# Patient Record
(demographics unavailable — no encounter records)

---

## 2024-12-31 NOTE — REASON FOR VISIT
[FreeTextEntry2] : Patient is a 50 year old female with complaints of darryl knee pain for about 6 months. She previously seen Dr. Marek Polo for both knees, had cortisone injections. Pain is worse when getting up from sitting down and walking. Patient has been taking Celebrex. Also tried ointments.

## 2024-12-31 NOTE — HISTORY OF PRESENT ILLNESS
[10] : 10 [9] : 9 [Sharp] : sharp [Shooting] : shooting [Constant] : constant [Household chores] : household chores [Leisure] : leisure [Rest] : rest [Meds] : meds [Ice] : ice [Heat] : heat [Sitting] : sitting [Walking] : walking [Full time] : Work status: full time [FreeTextEntry1] : darryl knees [] : Post Surgical Visit: no [FreeTextEntry9] : Celebrex lidocaine  [de-identified] : 6/27/24 [de-identified] : Dr. Polo

## 2024-12-31 NOTE — ASSESSMENT
[FreeTextEntry1] : diagnosis discussed and treatment options including TKRs. Will try CSIs today. Instructed on ice and knee braces. Prescribed PT and diclofenac gel. Will order Gelsyn injections both knees. Possible risks of injections discussed including infection, wants to proceed.

## 2024-12-31 NOTE — PHYSICAL EXAM
[NL (0)] : extension 0 degrees [4___] : quadriceps 4[unfilled]/5 [] : antalgic [Bilateral] : knee bilaterally [AP] : anteroposterior [Lateral] : lateral [[place]] : skyline [Degenerative change] : Degenerative change [Moderate tricompartmental OA medial narrowing] : Moderate tricompartmental OA medial narrowing [Moderate tricompartmental OA lateral narrowing] : Moderate tricompartmental OA lateral narrowing [Moderate patellofemoral OA] : Moderate patellofemoral OA [TWNoteComboBox7] : flexion 120 degrees

## 2025-01-27 NOTE — HISTORY OF PRESENT ILLNESS
[10] : 10 [9] : 9 [Sharp] : sharp [Shooting] : shooting [Constant] : constant [Household chores] : household chores [Leisure] : leisure [Rest] : rest [Meds] : meds [Ice] : ice [Heat] : heat [Sitting] : sitting [Walking] : walking [Full time] : Work status: full time [1] : 1 [Orthovisc] : Orthovisc [] : Post Surgical Visit: no [FreeTextEntry1] : darryl knees [de-identified] : 12/31/24 [FreeTextEntry9] : Celebrex lidocaine  [de-identified] : Dr. Polo

## 2025-01-27 NOTE — PHYSICAL EXAM
[NL (0)] : extension 0 degrees [4___] : quadriceps 4[unfilled]/5 [Bilateral] : knee bilaterally [AP] : anteroposterior [Lateral] : lateral [[place]] : skyline [Degenerative change] : Degenerative change [Moderate tricompartmental OA medial narrowing] : Moderate tricompartmental OA medial narrowing [Moderate tricompartmental OA lateral narrowing] : Moderate tricompartmental OA lateral narrowing [Moderate patellofemoral OA] : Moderate patellofemoral OA [] : no calf tenderness [TWNoteComboBox7] : flexion 120 degrees

## 2025-02-05 NOTE — PROCEDURE
[Large Joint Injection] : Large joint injection [Bilateral] : bilaterally of the [Knee] : knee [Pain] : pain [X-ray evidence of Osteoarthritis on this or prior visit] : x-ray evidence of Osteoarthritis on this or prior visit [Betadine] : betadine [Ethyl Chloride sprayed topically] : ethyl chloride sprayed topically [Sterile technique used] : sterile technique used [Orthovisc (30mg)] : 30mg of Orthovisc [] : Patient tolerated procedure well [Apply ice for 15min out of every hour for the next 12-24 hours as tolerated] : apply ice for 15 minutes out of every hour for the next 12-24 hours as tolerated [Patient was advised to rest the joint(s) for ____ days] : patient was advised to rest the joint(s) for [unfilled] days [Risks, benefits, alternatives discussed / Verbal consent obtained] : the risks benefits, and alternatives have been discussed, and verbal consent was obtained [Prior failure or difficult injection] : prior failure or difficult injection [All ultrasound images have been permanently captured and stored accordingly in our picture archiving and communication system] : All ultrasound images have been permanently captured and stored accordingly in our picture archiving and communication system [Visualization of the needle and placement of injection was performed without complication] : visualization of the needle and placement of injection was performed without complication [#2] : series #2

## 2025-02-05 NOTE — ASSESSMENT
[FreeTextEntry1] : recommend rest and apply ice to the knee today requested diclofenac, prescription given. Instructions also  The patient was prescribed an anti- inflammatory medication at today's visit. The patient was advised that this medication should be taken with food as they can cause gastrointestinal upset. They patient is also aware that these medications may exacerbate any pre existing hypertension and they should speak with their medical doctor before starting the medication. They were advised to stop the medication if they experience any stomach upset or develop headaches or blurry vision.

## 2025-02-05 NOTE — HISTORY OF PRESENT ILLNESS
[10] : 10 [9] : 9 [Sharp] : sharp [Shooting] : shooting [Constant] : constant [Household chores] : household chores [Leisure] : leisure [Rest] : rest [Meds] : meds [Ice] : ice [Heat] : heat [Sitting] : sitting [Walking] : walking [Full time] : Work status: full time [2] : 2 [Orthovisc] : Orthovisc [] : Post Surgical Visit: no [FreeTextEntry1] : darryl knees [FreeTextEntry9] : Celebrex lidocaine  [de-identified] : 12/31/24 [de-identified] : Dr. Polo

## 2025-02-05 NOTE — PHYSICAL EXAM
[NL (0)] : extension 0 degrees [4___] : quadriceps 4[unfilled]/5 [] : no calf tenderness [TWNoteComboBox7] : flexion 120 degrees

## 2025-02-10 NOTE — PHYSICAL EXAM
[NL (0)] : extension 0 degrees [4___] : quadriceps 4[unfilled]/5 [Bilateral] : knee bilaterally [] : no calf tenderness [TWNoteComboBox7] : flexion 120 degrees

## 2025-02-10 NOTE — HISTORY OF PRESENT ILLNESS
[10] : 10 [9] : 9 [Sharp] : sharp [Shooting] : shooting [Constant] : constant [Household chores] : household chores [Leisure] : leisure [Rest] : rest [Meds] : meds [Ice] : ice [Heat] : heat [Sitting] : sitting [Walking] : walking [Full time] : Work status: full time [3] : 3 [Orthovisc] : Orthovisc [] : Post Surgical Visit: no [FreeTextEntry1] : darryl knees [FreeTextEntry9] : Celebrex lidocaine  [de-identified] : 2/05/25 [de-identified] : Dr. Delaney [de-identified] : 2/05/25

## 2025-02-17 NOTE — PROCEDURE
[Large Joint Injection] : Large joint injection [Bilateral] : bilaterally of the [Knee] : knee [Pain] : pain [X-ray evidence of Osteoarthritis on this or prior visit] : x-ray evidence of Osteoarthritis on this or prior visit [Betadine] : betadine [Ethyl Chloride sprayed topically] : ethyl chloride sprayed topically [Sterile technique used] : sterile technique used [Orthovisc (30mg)] : 30mg of Orthovisc [] : Patient tolerated procedure well [Apply ice for 15min out of every hour for the next 12-24 hours as tolerated] : apply ice for 15 minutes out of every hour for the next 12-24 hours as tolerated [Patient was advised to rest the joint(s) for ____ days] : patient was advised to rest the joint(s) for [unfilled] days [Risks, benefits, alternatives discussed / Verbal consent obtained] : the risks benefits, and alternatives have been discussed, and verbal consent was obtained [Prior failure or difficult injection] : prior failure or difficult injection [All ultrasound images have been permanently captured and stored accordingly in our picture archiving and communication system] : All ultrasound images have been permanently captured and stored accordingly in our picture archiving and communication system [Visualization of the needle and placement of injection was performed without complication] : visualization of the needle and placement of injection was performed without complication [#4] : series #4

## 2025-02-17 NOTE — HISTORY OF PRESENT ILLNESS
[10] : 10 [9] : 9 [Sharp] : sharp [Shooting] : shooting [Constant] : constant [Household chores] : household chores [Leisure] : leisure [Rest] : rest [Meds] : meds [Ice] : ice [Heat] : heat [Sitting] : sitting [Walking] : walking [Full time] : Work status: full time [4] : 4 [Orthovisc] : Orthovisc [] : Post Surgical Visit: no [FreeTextEntry1] : darryl knees [FreeTextEntry9] : Celebrex lidocaine  [de-identified] : 2/05/25 [de-identified] : 2/10/25 [de-identified] : Dr. Delaney

## 2025-02-17 NOTE — PHYSICAL EXAM
[Bilateral] : knee bilaterally [NL (0)] : extension 0 degrees [4___] : quadriceps 4[unfilled]/5 [] : no calf tenderness [TWNoteComboBox7] : flexion 120 degrees

## 2025-03-18 NOTE — PHYSICAL EXAM
[de-identified] :  The patient appears well nourished and in no apparent distress. The patient is alert and oriented to person, place, and time. Affect and mood appear normal. The head is normocephalic and atraumatic. The eyes reveal normal sclera and extra ocular muscles are intact. The tongue is midline with no apparent lesions. Skin shows normal turgor with no evidence of eczema or psoriasis. No respiratory distress noted. Sensation grossly intact. [de-identified] :  Exam of the right knee shows a varus alignment which is correctable, 0 to 108 degrees of flexion measured with a goniometer.  Exam of the left knee shows 0 to 122 degrees of flexion measured with a goniometer. 5/5 motor strength bilaterally distally. Sensation intact distally. [de-identified] : X-ray: 4 views of the left knee demonstrate bone on bone arthritis X-ray: 4 views of the right knee demonstrate bone on bone arthritis

## 2025-03-18 NOTE — HISTORY OF PRESENT ILLNESS
[de-identified] : Ms. AMEENA HOLDEN is a 50 year old female presenting for evaluation of bilateral knee pain right worse than left. Her knee pain is localized medially. Pain is worse with all walking, as well as with deep ROM. She had steroid injections in June which lasted only 2 months. She then had gel injections ending two weeks ago without improvement. She has tried PT and NSAIDs without improvement. Patient is hopeful to discuss TKR.

## 2025-03-18 NOTE — DISCUSSION/SUMMARY
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  The patient has exhausted a minimum of 3 months conservative treatment including prior injections, physical therapy, over the counter NSAIDS and pain medication where indicated. In addition, the patient's quality of life is diminished due to significant chronic pain. The patient is having difficulty ambulating, descending stairs, and rising from a seated position. Based upon the patient's continued symptoms and failure to respond to conservative treatment, I have recommended a right total knee replacement for this patient. A long discussion took place with the patient describing what a total joint replacement is and what the procedure would entail. A knee model, similar to the implants that will be used during the operation, was utilized to demonstrate the implants. Choices of implant manufactures were discussed and reviewed. The ability to secure the implant utilizing cement or cementless (press fit) fixation was discussed. The patient agrees with the plan of care as well as the use of implants.   The hospitalization and post-operative care and rehabilitation were also discussed. The use of perioperative antibiotics and DVT prophylaxis were discussed. The risk, benefits and alternatives to a surgical intervention were discussed at length with the patient. The patient was also advised of risks related to the medical comorbidities and elevated body mass index (BMI). A lengthy discussion took place to review the most common complications including but not limited to: deep vein thrombosis, pulmonary embolus, heart attack, stroke, infection, wound breakdown, numbness, damage to nerves, tendon, muscles, arteries or other blood vessels, death and other possible complications from anesthesia. The patient was told that we will take steps to minimize these risks by using sterile technique, antibiotics and DVT prophylaxis when appropriate and follow the patient postoperatively in the office setting to monitor progress. The possibility of recurrent pain, no improvement in pain and actual worsening of pain were also discussed with the patient.   The discharge plan of care focused on the patient going home following surgery. The patient was encouraged to make the necessary arrangements to have someone stay with them when they are discharged home. Following discharge, a home care nurse will visit the patient. The home care nurse will open your home care case and request home physical therapy services. Home physical therapy will commence following discharge provided it is appropriate and covered by the health insurance benefit plan.   The benefits of surgery were discussed with the patient including the potential for improving the patient's current clinical condition through operative intervention. Alternatives to surgical intervention including continued conservative management were also discussed in detail. All questions were answered to the satisfaction of the patient. The treatment plan of care, as well as a model of a total knee equivalent to the one that will be used for their total joint replacement, was shared with the patient. The patient participated and agreed to the plan of care as well as the use of the recommended implants for their total joint replacement surgery.   We discussed that the knee replacement will be done with robotic assistance to enhance accuracy and dynamic joint balancing.